# Patient Record
Sex: MALE | Race: WHITE
[De-identification: names, ages, dates, MRNs, and addresses within clinical notes are randomized per-mention and may not be internally consistent; named-entity substitution may affect disease eponyms.]

---

## 2017-07-21 ENCOUNTER — HOSPITAL ENCOUNTER (OUTPATIENT)
Dept: HOSPITAL 84 - D.CT | Age: 82
Discharge: HOME | End: 2017-07-21
Attending: ORTHOPAEDIC SURGERY
Payer: MEDICARE

## 2017-07-21 VITALS — BODY MASS INDEX: 29.2 KG/M2

## 2017-07-21 DIAGNOSIS — M54.16: Primary | ICD-10-CM

## 2017-09-21 ENCOUNTER — HOSPITAL ENCOUNTER (EMERGENCY)
Dept: HOSPITAL 84 - D.ER | Age: 82
Discharge: HOME | End: 2017-09-21
Payer: MEDICARE

## 2017-09-21 VITALS — BODY MASS INDEX: 29.2 KG/M2

## 2017-09-21 DIAGNOSIS — I48.91: ICD-10-CM

## 2017-09-21 DIAGNOSIS — Z95.0: ICD-10-CM

## 2017-09-21 DIAGNOSIS — A41.9: Primary | ICD-10-CM

## 2017-09-21 DIAGNOSIS — K92.2: ICD-10-CM

## 2017-09-21 DIAGNOSIS — I25.810: ICD-10-CM

## 2017-09-21 DIAGNOSIS — Z85.46: ICD-10-CM

## 2017-09-21 DIAGNOSIS — I10: ICD-10-CM

## 2017-09-21 LAB
ALBUMIN SERPL-MCNC: 3.5 G/DL (ref 3.4–5)
ALP SERPL-CCNC: 108 U/L (ref 46–116)
ALT SERPL-CCNC: 34 U/L (ref 10–68)
ANION GAP SERPL CALC-SCNC: 12.8 MMOL/L (ref 8–16)
APTT BLD: 37.9 SECONDS (ref 22.8–39.4)
BASOPHILS NFR BLD AUTO: 0.4 % (ref 0–2)
BILIRUB SERPL-MCNC: 0.28 MG/DL (ref 0.2–1.3)
BUN SERPL-MCNC: 25 MG/DL (ref 7–18)
CALCIUM SERPL-MCNC: 9.9 MG/DL (ref 8.5–10.1)
CHLORIDE SERPL-SCNC: 101 MMOL/L (ref 98–107)
CO2 SERPL-SCNC: 25.5 MMOL/L (ref 21–32)
CREAT SERPL-MCNC: 1.4 MG/DL (ref 0.6–1.3)
EOSINOPHIL NFR BLD: 6.2 % (ref 0–7)
ERYTHROCYTE [DISTWIDTH] IN BLOOD BY AUTOMATED COUNT: 13.7 % (ref 11.5–14.5)
GLOBULIN SER-MCNC: 3.8 G/L
GLUCOSE SERPL-MCNC: 162 MG/DL (ref 74–106)
HCT VFR BLD CALC: 32 % (ref 42–54)
HGB BLD-MCNC: 10.7 G/DL (ref 13.5–17.5)
IMM GRANULOCYTES NFR BLD: 1 % (ref 0–5)
INR PPP: 1.14 (ref 0.85–1.17)
LYMPHOCYTES NFR BLD AUTO: 23.8 % (ref 15–50)
MCH RBC QN AUTO: 32.2 PG (ref 26–34)
MCHC RBC AUTO-ENTMCNC: 33.4 G/DL (ref 31–37)
MCV RBC: 96.4 FL (ref 80–100)
MONOCYTES NFR BLD: 9.5 % (ref 2–11)
NEUTROPHILS NFR BLD AUTO: 59.1 % (ref 40–80)
OSMOLALITY SERPL CALC.SUM OF ELEC: 277 MOSM/KG (ref 275–300)
PLATELET # BLD: 240 10X3/UL (ref 130–400)
PMV BLD AUTO: 10.3 FL (ref 7.4–10.4)
POTASSIUM SERPL-SCNC: 4.3 MMOL/L (ref 3.5–5.1)
PROT SERPL-MCNC: 7.3 G/DL (ref 6.4–8.2)
PROTHROMBIN TIME: 14.5 SECONDS (ref 11.6–15)
RBC # BLD AUTO: 3.32 10X6/UL (ref 4.2–6.1)
SODIUM SERPL-SCNC: 135 MMOL/L (ref 136–145)
WBC # BLD AUTO: 8.9 10X3/UL (ref 4.8–10.8)

## 2017-09-27 ENCOUNTER — HOSPITAL ENCOUNTER (OUTPATIENT)
Dept: HOSPITAL 84 - D.OPS | Age: 82
Discharge: HOME | End: 2017-09-27
Attending: INTERNAL MEDICINE
Payer: MEDICARE

## 2017-09-27 VITALS — WEIGHT: 183.38 LBS | HEIGHT: 64 IN | BODY MASS INDEX: 31.31 KG/M2 | BODY MASS INDEX: 31.31 KG/M2

## 2017-09-27 VITALS — SYSTOLIC BLOOD PRESSURE: 150 MMHG | DIASTOLIC BLOOD PRESSURE: 68 MMHG

## 2017-09-27 DIAGNOSIS — K29.70: ICD-10-CM

## 2017-09-27 DIAGNOSIS — E03.9: ICD-10-CM

## 2017-09-27 DIAGNOSIS — Z01.812: ICD-10-CM

## 2017-09-27 DIAGNOSIS — D64.9: ICD-10-CM

## 2017-09-27 DIAGNOSIS — K92.1: Primary | ICD-10-CM

## 2017-09-27 DIAGNOSIS — K44.9: ICD-10-CM

## 2017-09-27 DIAGNOSIS — K20.9: ICD-10-CM

## 2017-09-27 DIAGNOSIS — J44.9: ICD-10-CM

## 2017-09-27 DIAGNOSIS — K22.2: ICD-10-CM

## 2017-09-27 DIAGNOSIS — K29.80: ICD-10-CM

## 2017-09-27 DIAGNOSIS — I10: ICD-10-CM

## 2017-09-27 LAB
ANION GAP SERPL CALC-SCNC: 14.7 MMOL/L (ref 8–16)
BASOPHILS NFR BLD AUTO: 0.4 % (ref 0–2)
BUN SERPL-MCNC: 23 MG/DL (ref 7–18)
CALCIUM SERPL-MCNC: 9.1 MG/DL (ref 8.5–10.1)
CHLORIDE SERPL-SCNC: 103 MMOL/L (ref 98–107)
CO2 SERPL-SCNC: 25.4 MMOL/L (ref 21–32)
CREAT SERPL-MCNC: 1.5 MG/DL (ref 0.6–1.3)
EOSINOPHIL NFR BLD: 5.4 % (ref 0–7)
ERYTHROCYTE [DISTWIDTH] IN BLOOD BY AUTOMATED COUNT: 13.9 % (ref 11.5–14.5)
GLUCOSE SERPL-MCNC: 99 MG/DL (ref 74–106)
H PYLORI IGG SER IA-ACNC: NEGATIVE
HCT VFR BLD CALC: 28.3 % (ref 42–54)
HGB BLD-MCNC: 9.4 G/DL (ref 13.5–17.5)
IMM GRANULOCYTES NFR BLD: 0.4 % (ref 0–5)
LYMPHOCYTES NFR BLD AUTO: 23.7 % (ref 15–50)
MCH RBC QN AUTO: 32.1 PG (ref 26–34)
MCHC RBC AUTO-ENTMCNC: 33.2 G/DL (ref 31–37)
MCV RBC: 96.6 FL (ref 80–100)
MONOCYTES NFR BLD: 11.7 % (ref 2–11)
NEUTROPHILS NFR BLD AUTO: 58.4 % (ref 40–80)
OSMOLALITY SERPL CALC.SUM OF ELEC: 281 MOSM/KG (ref 275–300)
PLATELET # BLD: 189 10X3/UL (ref 130–400)
PMV BLD AUTO: 10 FL (ref 7.4–10.4)
POTASSIUM SERPL-SCNC: 4.1 MMOL/L (ref 3.5–5.1)
RBC # BLD AUTO: 2.93 10X6/UL (ref 4.2–6.1)
SODIUM SERPL-SCNC: 139 MMOL/L (ref 136–145)
WBC # BLD AUTO: 7 10X3/UL (ref 4.8–10.8)

## 2017-09-27 NOTE — NUR
1150
DISCHARGE INSTRUCTIONS COMPLETE. PRESCIRPTIONS FOR FAMATODINE,
SUCRALFATE, AND AMOXICILLIN GIVEN. LABS ORDERED PER DR. RODRIGUEZ'S
ORDERS (ADDED ON TO PREVIOUSLY DRAWN BLOOD). ESCORTED OUT BY
VOLUNTEER.

## 2017-09-27 NOTE — OP
PATIENT NAME:  EDGARDO SARAVIA                            MEDICAL RECORD: [id number]
:34                                             LOCATION:D.OPS          
                                                         ADMISSION DATE:        
SURGEON:  MIGUEL RODRIGUEZ MD            
 
 
DATE OF OPERATION:  2017
 
PROCEDURE:  EGD with balloon dilatation.
 
MEDICATIONS PROVIDED:  Per TIVA anesthesia.  The patient received propofol 100
mg IV push and O2 which was variable.  We had to bag the patient 2 times during
the procedure due to a decreased saturation.  He was stable throughout the
procedure, but had a high risk.  He additionally received 1 gram of Ancef for
this procedure.
 
INDICATION FOR THE PROCEDURE:  Melenic stool and anemia.  This, according to the
patient, was present last week, but has resolved.  Of note, he is on Eliquis and
aspirin.  The patient has considerable comorbidities to include chronic
obstructive lung disease, myocardial infarction in , hypertension, cardiac
arrhythmias, prostate disease, and hypothyroidism.  He most recently was
hospitalized for Strep bovis infection and sepsis.  The patient has had
endoscopy with me in the past.  On 2016, he had a colonoscopy, which was
done for iron-deficiency anemia.  This was significant for a 0.5-cm polyp in the
cecum, which was seen to be a tubular adenoma.  He also had another 0.5-cm
adenoma in the ascending colon.  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
colon and he was provided a good bowel regimen after the procedure.  He also had
grade I internal hemorrhoids.  In 2016, this gentleman had an EGD
with me, which was significant for a distal esophageal ring, which he stated was
nonstenotic at that time.  He also was noted to have hiatal hernia, some
gastritis and hemorrhagic gastritis, and some benign narrowing at the entrance
to the second part of the duodenum.  The patient was provided Nexium one tablet
q.a.m. and we renewed his sucralfate 1 gram q.i.d.  He was asked to use caution
with anti-inflammatory drugs and follow reflux precautions stringently, both
dietary and positional.
 
Due to his multiple comorbidities and recent hospitalization for sepsis, we had
been hesitant to perform endoscopy; but as he has had some melenic stool, we
felt we would proceed with an EGD, feeling that the stomach was most likely the
source of bleeding.  The patient did receive 1 gram of Ancef as he has had heart
valve replacement.  Of note, the sepsis was due to Strep bovis, which can be
associated with endocarditis, which he did not have; and colorectal cancer.  The
patient had a CT of abdomen and pelvis in 2017 showing no significant
abnormality of the aorta.  He had postoperative changes with his median
sternotomy wires and permanent pacemaker noted.  He was seen on the CT to have a
large hiatal hernia, right renal cyst, previous prostatectomy, and multiple rib
fractures.
 
Given the melenic stool and the ongoing anemia, he will have an EGD on this
date.
 
FINDINGS:  Informed consent was given.  The patient was made comfortable with
the above medications.  After reaching an adequate level of sedation by slow IV
push, the patient was placed on his left side.  The endoscope was then advanced
under direct visualization through the posterior pharyngeal area and advanced to
the distal esophagus.  At the distal esophageal area, a stenotic Schatzki's ring
was noted and we gently placed a CRE Microvasive balloon in this area, held the
balloon in place for 60 seconds, and deflated the balloon.  There was a slight
 
 
 
OPERATIVE REPORT                               [id number]    EDGARDO SARAVIA          
 
 
amount of heme noted at the 7 o'clock position after removal of the balloon.  We
proceeded then through the hiatal hernia into the gastric proper.  We
retroflexed, exposing the hiatal hernia as well as the fundus and the stomach
body and no significant inflammation was appreciated in this area.  We then
advanced the scope to the antral area, where some bilious erosive gastritis was
appreciated but to a very minimal degree.  In passing through the antrum to the
duodenal bulb, it was noted that only minimal inflammation was noted.  We then
proceeded to the area of the proximal second portion of the duodenum, where a
stricture was again seen.  The tissue was somewhat inflamed in this area due to
the narrowing.  We were unable to pass the scope through this area, and as the
patient had his procedure disrupted twice during the procedure so that we might
bag him due to some respiratory depression, we did not feel we had additional
time to dilate this stricture.  The scope was then withdrawn.  No biopsies were
obtained again due to the lack of time as the patient was at risk for this
procedure with his respiratory depression and the compromise which we were
facing in attempting to give him adequate sedation.  The scope was then
withdrawn.
 
IMPRESSION:
1.  Minimal distal esophagitis.
2.  Stenotic Schatzki's ring, which was dilated to ____ with a good result.
3.  Large hiatal hernia.
4.  Very mild bilious and slightly erosive gastritis.
5.  Mild duodenitis in the duodenal bulb.
6.  Narrowing, which had been seen previously at the entrance to the second part
of the duodenum.  Unable to pass the scope through this area.
7.  Of note, we had to disrupt the procedure twice to bag the patient as he had
respiratory depression despite using a minimal amount of propofol.  His
respiratory reserve is compromised.  We were easily able to correct this
gentleman's oxygenation deficit with bagging and at no time was the procedure
severely compromised.
 
RECOMMENDATIONS:
1.  We will continue Carafate at a dose of 1 gram q.i.d.
2.  The patient will take famotidine 20 mg p.o. b.i.d.
3.  We will provide a dose of amoxicillin 1 gram to be taken 6 hours after this
procedure to protect his heart valves.
4.  The patient cannot have any additional endoscopies unless he is intubated,
which certainly increases the risk, and should not be done unless he is
experiencing significant hemorrhage.
5.  He is not a candidate for colonoscopy.  A barium enema will be ordered if he
wishes to proceed.
 
TRANSINT:[id number] Voice Confirmation ID: [id number] DOCUMENT ID: [id number]
                                           
                                           MIGUEL RODRIGUEZ MD            
 
CC: DIEGO GRIMES MD                                             [id number]
DICTATION DATE: 17 1051     :     17 1235      [facility] 
                                                                      17
[facility]                                          
[address] [place] [address]

## 2018-03-20 ENCOUNTER — HOSPITAL ENCOUNTER (OUTPATIENT)
Dept: HOSPITAL 84 - D.CATH | Age: 83
Discharge: HOME | End: 2018-03-20
Attending: INTERNAL MEDICINE
Payer: MEDICARE

## 2018-03-20 VITALS
WEIGHT: 180.38 LBS | WEIGHT: 180.38 LBS | HEIGHT: 64 IN | HEIGHT: 64 IN | BODY MASS INDEX: 30.8 KG/M2 | BODY MASS INDEX: 30.8 KG/M2

## 2018-03-20 VITALS — DIASTOLIC BLOOD PRESSURE: 81 MMHG | SYSTOLIC BLOOD PRESSURE: 163 MMHG

## 2018-03-20 DIAGNOSIS — I49.5: ICD-10-CM

## 2018-03-20 DIAGNOSIS — R94.30: ICD-10-CM

## 2018-03-20 DIAGNOSIS — R06.02: ICD-10-CM

## 2018-03-20 DIAGNOSIS — I25.119: Primary | ICD-10-CM

## 2018-03-20 DIAGNOSIS — I34.0: ICD-10-CM

## 2018-03-20 DIAGNOSIS — Z01.812: ICD-10-CM

## 2018-03-20 LAB
ANION GAP SERPL CALC-SCNC: 17.9 MMOL/L (ref 8–16)
BASOPHILS NFR BLD AUTO: 0 % (ref 0–2)
BUN SERPL-MCNC: 49 MG/DL (ref 7–18)
CALCIUM SERPL-MCNC: 9.8 MG/DL (ref 8.5–10.1)
CHLORIDE SERPL-SCNC: 103 MMOL/L (ref 98–107)
CO2 SERPL-SCNC: 21.8 MMOL/L (ref 21–32)
CREAT SERPL-MCNC: 1.5 MG/DL (ref 0.6–1.3)
EOSINOPHIL NFR BLD: 0.2 % (ref 0–7)
ERYTHROCYTE [DISTWIDTH] IN BLOOD BY AUTOMATED COUNT: 16.4 % (ref 11.5–14.5)
GLUCOSE SERPL-MCNC: 124 MG/DL (ref 74–106)
HCT VFR BLD CALC: 30.1 % (ref 42–54)
HGB BLD-MCNC: 9.6 G/DL (ref 13.5–17.5)
IMM GRANULOCYTES NFR BLD: 1.4 % (ref 0–5)
LYMPHOCYTES NFR BLD AUTO: 24.7 % (ref 15–50)
MCH RBC QN AUTO: 28.2 PG (ref 26–34)
MCHC RBC AUTO-ENTMCNC: 31.9 G/DL (ref 31–37)
MCV RBC: 88.3 FL (ref 80–100)
MONOCYTES NFR BLD: 12.4 % (ref 2–11)
NEUTROPHILS NFR BLD AUTO: 61.3 % (ref 40–80)
OSMOLALITY SERPL CALC.SUM OF ELEC: 289 MOSM/KG (ref 275–300)
PLATELET # BLD: 224 10X3/UL (ref 130–400)
PMV BLD AUTO: 10.4 FL (ref 7.4–10.4)
POTASSIUM SERPL-SCNC: 4.7 MMOL/L (ref 3.5–5.1)
RBC # BLD AUTO: 3.41 10X6/UL (ref 4.2–6.1)
SODIUM SERPL-SCNC: 138 MMOL/L (ref 136–145)
WBC # BLD AUTO: 8.9 10X3/UL (ref 4.8–10.8)

## 2018-03-20 PROCEDURE — 93459 L HRT ART/GRFT ANGIO: CPT

## 2019-01-15 NOTE — NUR
UNABLE TO DO MYELOGRAM. PATIENT IS ON PLAVIX. WILL CALL TO RESCHEDULE WHEN OFF
OF PLAVIX FOR 3 DAYS.

## 2019-01-28 ENCOUNTER — HOSPITAL ENCOUNTER (OUTPATIENT)
Dept: HOSPITAL 84 - D.RAD | Age: 84
Discharge: HOME | End: 2019-01-28
Attending: NURSE PRACTITIONER
Payer: MEDICARE

## 2019-01-28 VITALS — BODY MASS INDEX: 30.9 KG/M2

## 2019-01-28 DIAGNOSIS — M48.061: Primary | ICD-10-CM

## 2019-01-30 ENCOUNTER — HOSPITAL ENCOUNTER (INPATIENT)
Dept: HOSPITAL 84 - D.ER | Age: 84
LOS: 6 days | Discharge: NURSING, REHAB OR LONG-TERM CARE | DRG: 520 | End: 2019-02-05
Attending: INTERNAL MEDICINE | Admitting: INTERNAL MEDICINE
Payer: MEDICARE

## 2019-01-30 VITALS — DIASTOLIC BLOOD PRESSURE: 40 MMHG | SYSTOLIC BLOOD PRESSURE: 119 MMHG

## 2019-01-30 VITALS — HEIGHT: 64 IN | BODY MASS INDEX: 31.48 KG/M2 | WEIGHT: 184.39 LBS

## 2019-01-30 DIAGNOSIS — I25.10: ICD-10-CM

## 2019-01-30 DIAGNOSIS — I48.91: ICD-10-CM

## 2019-01-30 DIAGNOSIS — I10: ICD-10-CM

## 2019-01-30 DIAGNOSIS — G62.9: ICD-10-CM

## 2019-01-30 DIAGNOSIS — E11.9: ICD-10-CM

## 2019-01-30 DIAGNOSIS — M48.061: Primary | ICD-10-CM

## 2019-01-30 DIAGNOSIS — M48.062: ICD-10-CM

## 2019-01-30 DIAGNOSIS — M51.16: ICD-10-CM

## 2019-01-30 LAB
APPEARANCE UR: CLEAR
BILIRUB SERPL-MCNC: NEGATIVE MG/DL
COLOR UR: YELLOW
GLUCOSE SERPL-MCNC: NEGATIVE MG/DL
KETONES UR STRIP-MCNC: NEGATIVE MG/DL
NITRITE UR-MCNC: NEGATIVE MG/ML
PH UR STRIP: 5 [PH] (ref 5–6)
PROT UR-MCNC: NEGATIVE MG/DL
SP GR UR STRIP: 1.01 (ref 1–1.02)
UROBILINOGEN UR-MCNC: NORMAL MG/DL

## 2019-01-30 NOTE — NUR
ASSESSMENT AS PER FLOWSHEET. ALERT/ORIENTED X3 SON IN ROOM UP WITH HELP TO BR
VOIDED UA SPECIMEN OBTAINED AND SENT TO THE LAB. IV PATENT LEFT AC SALINE
LOCKED. TELM. SHOWS PACED AT 61. NO DISTRESS. SR UP X2 CALL LIGHT WITHIN
REACH.

## 2019-01-31 VITALS — DIASTOLIC BLOOD PRESSURE: 66 MMHG | SYSTOLIC BLOOD PRESSURE: 120 MMHG

## 2019-01-31 VITALS — DIASTOLIC BLOOD PRESSURE: 60 MMHG | SYSTOLIC BLOOD PRESSURE: 116 MMHG

## 2019-01-31 VITALS — SYSTOLIC BLOOD PRESSURE: 142 MMHG | DIASTOLIC BLOOD PRESSURE: 69 MMHG

## 2019-01-31 VITALS — SYSTOLIC BLOOD PRESSURE: 115 MMHG | DIASTOLIC BLOOD PRESSURE: 57 MMHG

## 2019-01-31 VITALS — DIASTOLIC BLOOD PRESSURE: 63 MMHG | SYSTOLIC BLOOD PRESSURE: 172 MMHG

## 2019-01-31 LAB
ANION GAP SERPL CALC-SCNC: 12.7 MMOL/L (ref 8–16)
BASOPHILS NFR BLD AUTO: 0.8 % (ref 0–2)
BUN SERPL-MCNC: 26 MG/DL (ref 7–18)
CALCIUM SERPL-MCNC: 8.6 MG/DL (ref 8.5–10.1)
CHLORIDE SERPL-SCNC: 105 MMOL/L (ref 98–107)
CO2 SERPL-SCNC: 24.6 MMOL/L (ref 21–32)
CREAT SERPL-MCNC: 1.5 MG/DL (ref 0.6–1.3)
EOSINOPHIL NFR BLD: 3.6 % (ref 0–7)
ERYTHROCYTE [DISTWIDTH] IN BLOOD BY AUTOMATED COUNT: 13.4 % (ref 11.5–14.5)
GLUCOSE SERPL-MCNC: 91 MG/DL (ref 74–106)
HCT VFR BLD CALC: 31.7 % (ref 42–54)
HGB BLD-MCNC: 10.4 G/DL (ref 13.5–17.5)
IMM GRANULOCYTES NFR BLD: 1 % (ref 0–5)
INR PPP: 1.04 (ref 0.85–1.17)
LYMPHOCYTES NFR BLD AUTO: 23.6 % (ref 15–50)
MCH RBC QN AUTO: 32.8 PG (ref 26–34)
MCHC RBC AUTO-ENTMCNC: 32.8 G/DL (ref 31–37)
MCV RBC: 100 FL (ref 80–100)
MONOCYTES NFR BLD: 14.8 % (ref 2–11)
NEUTROPHILS NFR BLD AUTO: 56.2 % (ref 40–80)
OSMOLALITY SERPL CALC.SUM OF ELEC: 280 MOSM/KG (ref 275–300)
PLATELET # BLD: 156 10X3/UL (ref 130–400)
PMV BLD AUTO: 10.8 FL (ref 7.4–10.4)
POTASSIUM SERPL-SCNC: 4.3 MMOL/L (ref 3.5–5.1)
PROTHROMBIN TIME: 13.1 SECONDS (ref 11.6–15)
RBC # BLD AUTO: 3.17 10X6/UL (ref 4.2–6.1)
SODIUM SERPL-SCNC: 138 MMOL/L (ref 136–145)
WBC # BLD AUTO: 6.1 10X3/UL (ref 4.8–10.8)

## 2019-01-31 NOTE — NUR
PT C/O BACK PAIN 8/10. GAVE NORCO-10 PO. ASSESSMENT COMPLETE PER FLOWSHEET.
DISCUSSED POSSIBLE SURGERY. PT AGREES TO BE NPO AFTER MIDNIGHT IN CASE CLEARED
FOR SURGERY.

## 2019-02-01 VITALS — DIASTOLIC BLOOD PRESSURE: 50 MMHG | SYSTOLIC BLOOD PRESSURE: 105 MMHG

## 2019-02-01 VITALS — DIASTOLIC BLOOD PRESSURE: 57 MMHG | SYSTOLIC BLOOD PRESSURE: 137 MMHG

## 2019-02-01 VITALS — SYSTOLIC BLOOD PRESSURE: 108 MMHG | DIASTOLIC BLOOD PRESSURE: 54 MMHG

## 2019-02-01 VITALS — DIASTOLIC BLOOD PRESSURE: 44 MMHG | SYSTOLIC BLOOD PRESSURE: 100 MMHG

## 2019-02-01 VITALS — DIASTOLIC BLOOD PRESSURE: 64 MMHG | SYSTOLIC BLOOD PRESSURE: 138 MMHG

## 2019-02-01 VITALS — SYSTOLIC BLOOD PRESSURE: 165 MMHG | DIASTOLIC BLOOD PRESSURE: 71 MMHG

## 2019-02-01 LAB
ANION GAP SERPL CALC-SCNC: 14.7 MMOL/L (ref 8–16)
BASOPHILS NFR BLD AUTO: 0.6 % (ref 0–2)
BUN SERPL-MCNC: 28 MG/DL (ref 7–18)
CALCIUM SERPL-MCNC: 8.5 MG/DL (ref 8.5–10.1)
CHLORIDE SERPL-SCNC: 103 MMOL/L (ref 98–107)
CO2 SERPL-SCNC: 23.7 MMOL/L (ref 21–32)
CREAT SERPL-MCNC: 1.6 MG/DL (ref 0.6–1.3)
EOSINOPHIL NFR BLD: 3.9 % (ref 0–7)
ERYTHROCYTE [DISTWIDTH] IN BLOOD BY AUTOMATED COUNT: 13.4 % (ref 11.5–14.5)
FERRITIN SERPL-MCNC: 270 NG/ML (ref 3–244)
GLUCOSE SERPL-MCNC: 113 MG/DL (ref 74–106)
HCT VFR BLD CALC: 31.5 % (ref 42–54)
HGB BLD-MCNC: 10.4 G/DL (ref 13.5–17.5)
IMM GRANULOCYTES NFR BLD: 1.5 % (ref 0–5)
IRON SERPL-MCNC: 74 UG/DL (ref 35–150)
LYMPHOCYTES NFR BLD AUTO: 30.2 % (ref 15–50)
MCH RBC QN AUTO: 32.8 PG (ref 26–34)
MCHC RBC AUTO-ENTMCNC: 33 G/DL (ref 31–37)
MCV RBC: 99.4 FL (ref 80–100)
MONOCYTES NFR BLD: 17.6 % (ref 2–11)
NEUTROPHILS NFR BLD AUTO: 46.2 % (ref 40–80)
OSMOLALITY SERPL CALC.SUM OF ELEC: 280 MOSM/KG (ref 275–300)
PLATELET # BLD: 149 10X3/UL (ref 130–400)
PMV BLD AUTO: 10.7 FL (ref 7.4–10.4)
POTASSIUM SERPL-SCNC: 4.4 MMOL/L (ref 3.5–5.1)
RBC # BLD AUTO: 3.17 10X6/UL (ref 4.2–6.1)
SAO2 % BLD FROM PO2: 36 % (ref 15–55)
SODIUM SERPL-SCNC: 137 MMOL/L (ref 136–145)
TIBC SERPL-MCNC: 201 UG/DL (ref 260–445)
UIBC SERPL-MCNC: 127 UG/DL (ref 150–375)
WBC # BLD AUTO: 6.5 10X3/UL (ref 4.8–10.8)

## 2019-02-01 NOTE — NUR
PATIENT RESTING IN BED WITH INTERMITENT PAIN REPORTED TO LOWER BACK THAT
RADIATES DOWN BOTH HIPS AND BACK SIDE OF KNEES. PATIENT NPO FOR POSSIBLE
PROCEDURE TODAY IF CARDIAC CLEARANCE FROM DR SAGASTUME.

## 2019-02-02 VITALS — SYSTOLIC BLOOD PRESSURE: 106 MMHG | DIASTOLIC BLOOD PRESSURE: 44 MMHG

## 2019-02-02 VITALS — DIASTOLIC BLOOD PRESSURE: 46 MMHG | SYSTOLIC BLOOD PRESSURE: 104 MMHG

## 2019-02-02 VITALS — DIASTOLIC BLOOD PRESSURE: 49 MMHG | SYSTOLIC BLOOD PRESSURE: 108 MMHG

## 2019-02-02 VITALS — DIASTOLIC BLOOD PRESSURE: 43 MMHG | SYSTOLIC BLOOD PRESSURE: 106 MMHG

## 2019-02-02 VITALS — DIASTOLIC BLOOD PRESSURE: 60 MMHG | SYSTOLIC BLOOD PRESSURE: 113 MMHG

## 2019-02-02 VITALS — DIASTOLIC BLOOD PRESSURE: 48 MMHG | SYSTOLIC BLOOD PRESSURE: 113 MMHG

## 2019-02-02 VITALS — SYSTOLIC BLOOD PRESSURE: 108 MMHG | DIASTOLIC BLOOD PRESSURE: 49 MMHG

## 2019-02-02 VITALS — DIASTOLIC BLOOD PRESSURE: 50 MMHG | SYSTOLIC BLOOD PRESSURE: 109 MMHG

## 2019-02-02 VITALS — SYSTOLIC BLOOD PRESSURE: 115 MMHG | DIASTOLIC BLOOD PRESSURE: 53 MMHG

## 2019-02-02 VITALS — DIASTOLIC BLOOD PRESSURE: 51 MMHG | SYSTOLIC BLOOD PRESSURE: 127 MMHG

## 2019-02-02 VITALS — DIASTOLIC BLOOD PRESSURE: 68 MMHG | SYSTOLIC BLOOD PRESSURE: 161 MMHG

## 2019-02-02 VITALS — DIASTOLIC BLOOD PRESSURE: 67 MMHG | SYSTOLIC BLOOD PRESSURE: 157 MMHG

## 2019-02-02 VITALS — SYSTOLIC BLOOD PRESSURE: 148 MMHG | DIASTOLIC BLOOD PRESSURE: 67 MMHG

## 2019-02-02 VITALS — SYSTOLIC BLOOD PRESSURE: 121 MMHG | DIASTOLIC BLOOD PRESSURE: 47 MMHG

## 2019-02-02 VITALS — DIASTOLIC BLOOD PRESSURE: 50 MMHG | SYSTOLIC BLOOD PRESSURE: 116 MMHG

## 2019-02-02 VITALS — SYSTOLIC BLOOD PRESSURE: 106 MMHG | DIASTOLIC BLOOD PRESSURE: 58 MMHG

## 2019-02-02 LAB
ANION GAP SERPL CALC-SCNC: 13.5 MMOL/L (ref 8–16)
BASOPHILS NFR BLD AUTO: 0.6 % (ref 0–2)
BUN SERPL-MCNC: 26 MG/DL (ref 7–18)
CALCIUM SERPL-MCNC: 8.5 MG/DL (ref 8.5–10.1)
CHLORIDE SERPL-SCNC: 105 MMOL/L (ref 98–107)
CO2 SERPL-SCNC: 25.1 MMOL/L (ref 21–32)
CREAT SERPL-MCNC: 1.5 MG/DL (ref 0.6–1.3)
EOSINOPHIL NFR BLD: 4.8 % (ref 0–7)
ERYTHROCYTE [DISTWIDTH] IN BLOOD BY AUTOMATED COUNT: 13.5 % (ref 11.5–14.5)
FOLATE SERPL-MCNC: 11.5 NG/ML (ref 3–?)
GLUCOSE SERPL-MCNC: 99 MG/DL (ref 74–106)
HCT VFR BLD CALC: 31.6 % (ref 42–54)
HGB BLD-MCNC: 10.5 G/DL (ref 13.5–17.5)
IMM GRANULOCYTES NFR BLD: 1.5 % (ref 0–5)
LYMPHOCYTES NFR BLD AUTO: 28.6 % (ref 15–50)
MCH RBC QN AUTO: 33.1 PG (ref 26–34)
MCHC RBC AUTO-ENTMCNC: 33.2 G/DL (ref 31–37)
MCV RBC: 99.7 FL (ref 80–100)
MONOCYTES NFR BLD: 17 % (ref 2–11)
NEUTROPHILS NFR BLD AUTO: 47.5 % (ref 40–80)
OSMOLALITY SERPL CALC.SUM OF ELEC: 282 MOSM/KG (ref 275–300)
PLATELET # BLD: 144 10X3/UL (ref 130–400)
PMV BLD AUTO: 10.7 FL (ref 7.4–10.4)
POTASSIUM SERPL-SCNC: 4.6 MMOL/L (ref 3.5–5.1)
RBC # BLD AUTO: 3.17 10X6/UL (ref 4.2–6.1)
SODIUM SERPL-SCNC: 139 MMOL/L (ref 136–145)
VIT B12 SERPL-MCNC: 1010 PG/ML (ref 232–1245)
WBC # BLD AUTO: 6.2 10X3/UL (ref 4.8–10.8)

## 2019-02-02 PROCEDURE — 00NY0ZZ RELEASE LUMBAR SPINAL CORD, OPEN APPROACH: ICD-10-PCS | Performed by: NEUROLOGICAL SURGERY

## 2019-02-02 NOTE — NUR
I&O COMPLETE - PT VOICED NO CONCERNS - DENIED PAIN - GRANDDAUGHTER AT BEDSIDE
- ASSISTED PATIENT WITH EATING DINNER TRAY - CPOC

## 2019-02-02 NOTE — NUR
FAMILY CALLED TO GIVE HOME PHONE NUMBER FOR F/U IF NEEDED [phone] -
ANSWERED ALL QUESTIONS TO FAMILY'S SATISFACTION

## 2019-02-02 NOTE — NUR
Received report. Pt currently in bed watching television. Provided urinal per
request. Pt denies further needs at this time.

## 2019-02-02 NOTE — NUR
FAMILY AT BEDSIDE FOR VISITATION - ANSWERED ALL QUETIONS TO FAMILY'S
SATISFACTION - DR. WILLIAM ON UNIT - INFORMED OF PT'S TRANSFER - AWAITING
ORDERS -

## 2019-02-02 NOTE — NUR
PT TRANSFERRED VIA BED FROM OPERATING ROOM WITH RECOVERY STAFF - PATIENT AA&O
X 4 - PAIN 2 -3 OF 10 SCALE - ASSESSMENT COMPLETE CPOC

## 2019-02-03 VITALS — SYSTOLIC BLOOD PRESSURE: 145 MMHG | DIASTOLIC BLOOD PRESSURE: 65 MMHG

## 2019-02-03 VITALS — SYSTOLIC BLOOD PRESSURE: 108 MMHG | DIASTOLIC BLOOD PRESSURE: 49 MMHG

## 2019-02-03 VITALS — DIASTOLIC BLOOD PRESSURE: 52 MMHG | SYSTOLIC BLOOD PRESSURE: 117 MMHG

## 2019-02-03 VITALS — SYSTOLIC BLOOD PRESSURE: 143 MMHG | DIASTOLIC BLOOD PRESSURE: 64 MMHG

## 2019-02-03 VITALS — DIASTOLIC BLOOD PRESSURE: 61 MMHG | SYSTOLIC BLOOD PRESSURE: 126 MMHG

## 2019-02-03 VITALS — SYSTOLIC BLOOD PRESSURE: 119 MMHG | DIASTOLIC BLOOD PRESSURE: 69 MMHG

## 2019-02-03 VITALS — SYSTOLIC BLOOD PRESSURE: 136 MMHG | DIASTOLIC BLOOD PRESSURE: 65 MMHG

## 2019-02-03 VITALS — DIASTOLIC BLOOD PRESSURE: 55 MMHG | SYSTOLIC BLOOD PRESSURE: 126 MMHG

## 2019-02-03 VITALS — SYSTOLIC BLOOD PRESSURE: 122 MMHG | DIASTOLIC BLOOD PRESSURE: 55 MMHG

## 2019-02-03 VITALS — SYSTOLIC BLOOD PRESSURE: 112 MMHG | DIASTOLIC BLOOD PRESSURE: 41 MMHG

## 2019-02-03 VITALS — DIASTOLIC BLOOD PRESSURE: 60 MMHG | SYSTOLIC BLOOD PRESSURE: 135 MMHG

## 2019-02-03 LAB
ANION GAP SERPL CALC-SCNC: 17 MMOL/L (ref 8–16)
BASOPHILS NFR BLD AUTO: 0.1 % (ref 0–2)
BUN SERPL-MCNC: 26 MG/DL (ref 7–18)
CALCIUM SERPL-MCNC: 8.5 MG/DL (ref 8.5–10.1)
CHLORIDE SERPL-SCNC: 105 MMOL/L (ref 98–107)
CO2 SERPL-SCNC: 21.9 MMOL/L (ref 21–32)
CREAT SERPL-MCNC: 1.3 MG/DL (ref 0.6–1.3)
EOSINOPHIL NFR BLD: 0 % (ref 0–7)
ERYTHROCYTE [DISTWIDTH] IN BLOOD BY AUTOMATED COUNT: 13.4 % (ref 11.5–14.5)
GLUCOSE SERPL-MCNC: 108 MG/DL (ref 74–106)
HCT VFR BLD CALC: 32.1 % (ref 42–54)
HGB BLD-MCNC: 10.7 G/DL (ref 13.5–17.5)
IMM GRANULOCYTES NFR BLD: 0.6 % (ref 0–5)
LYMPHOCYTES NFR BLD AUTO: 8.6 % (ref 15–50)
MCH RBC QN AUTO: 33.3 PG (ref 26–34)
MCHC RBC AUTO-ENTMCNC: 33.3 G/DL (ref 31–37)
MCV RBC: 100 FL (ref 80–100)
MONOCYTES NFR BLD: 11.7 % (ref 2–11)
NEUTROPHILS NFR BLD AUTO: 79 % (ref 40–80)
OSMOLALITY SERPL CALC.SUM OF ELEC: 283 MOSM/KG (ref 275–300)
PLATELET # BLD: 152 10X3/UL (ref 130–400)
PMV BLD AUTO: 10.8 FL (ref 7.4–10.4)
POTASSIUM SERPL-SCNC: 4.9 MMOL/L (ref 3.5–5.1)
RBC # BLD AUTO: 3.21 10X6/UL (ref 4.2–6.1)
SODIUM SERPL-SCNC: 139 MMOL/L (ref 136–145)
WBC # BLD AUTO: 12.2 10X3/UL (ref 4.8–10.8)

## 2019-02-03 NOTE — NUR
PT LYING IN BED, FAMILY AT BEDSIDE. NO SIGNS OF DISTRESS. ALERT AND ORIENTED.
STATES PAIN IN BACK 9/10. INFORMED PT IT WAS NOT YET TIME FOR HIS NORCO BUT I
COULD GIVE HIM TYLENOL. PT AGREED, TYLENOL GIVEN. IV RIGHT HAND INFUSING NS @
50. NO OTHER NEEDS OR COMPLAINTS AT THIS TIME. CL IN REACH, SRX2, BED LOWEST
POSITION. WILL CONTINUE TO MONITOR

## 2019-02-03 NOTE — NUR
PT TRANSPORTED TO ROOM 2239 VIA WHEELCHAIR. PT IS ABLE TO AMBULATE WITH ASSIST
FROM WHEELCHAIR TO BED. DRESSING TO LOWER BACK IS C/D/I. PT DENIES PRESENCE OF
PAIN AND N/V. PT DENIES DIZZINESS AND LIGHTHEADEDNESS. PIV NOTED TO RIGHT HAND
WITH NS @ 50. PIV TO LEFT AC THAT IS SALINE LOCKED. BED IS IN THE LOWEST
POSITION. CALL LIGHT AND BEDSIDE TABLE ARE WITHIN REACH. WILL CONT TO MONITOR.

## 2019-02-03 NOTE — NUR
REPORT RECIEVED, SHIFT ASSESSMENT COMPLETE, PT C/O OF HIP PAIN, ORDERED PAIN
MED GIVEN, VSS, CALL LIGHT IN REACH

## 2019-02-04 VITALS — SYSTOLIC BLOOD PRESSURE: 137 MMHG | DIASTOLIC BLOOD PRESSURE: 55 MMHG

## 2019-02-04 VITALS — DIASTOLIC BLOOD PRESSURE: 66 MMHG | SYSTOLIC BLOOD PRESSURE: 138 MMHG

## 2019-02-04 VITALS — DIASTOLIC BLOOD PRESSURE: 55 MMHG | SYSTOLIC BLOOD PRESSURE: 125 MMHG

## 2019-02-04 VITALS — DIASTOLIC BLOOD PRESSURE: 53 MMHG | SYSTOLIC BLOOD PRESSURE: 136 MMHG

## 2019-02-04 VITALS — SYSTOLIC BLOOD PRESSURE: 141 MMHG | DIASTOLIC BLOOD PRESSURE: 60 MMHG

## 2019-02-04 LAB
ANION GAP SERPL CALC-SCNC: 14.4 MMOL/L (ref 8–16)
BASOPHILS NFR BLD AUTO: 0.2 % (ref 0–2)
BUN SERPL-MCNC: 26 MG/DL (ref 7–18)
CALCIUM SERPL-MCNC: 8.2 MG/DL (ref 8.5–10.1)
CHLORIDE SERPL-SCNC: 105 MMOL/L (ref 98–107)
CO2 SERPL-SCNC: 24.1 MMOL/L (ref 21–32)
CREAT SERPL-MCNC: 1.3 MG/DL (ref 0.6–1.3)
EOSINOPHIL NFR BLD: 2.8 % (ref 0–7)
ERYTHROCYTE [DISTWIDTH] IN BLOOD BY AUTOMATED COUNT: 14.1 % (ref 11.5–14.5)
GLUCOSE SERPL-MCNC: 118 MG/DL (ref 74–106)
HCT VFR BLD CALC: 29.4 % (ref 42–54)
HGB BLD-MCNC: 9.6 G/DL (ref 13.5–17.5)
IMM GRANULOCYTES NFR BLD: 1.1 % (ref 0–5)
LYMPHOCYTES NFR BLD AUTO: 20.5 % (ref 15–50)
MCH RBC QN AUTO: 32.9 PG (ref 26–34)
MCHC RBC AUTO-ENTMCNC: 32.7 G/DL (ref 31–37)
MCV RBC: 100.7 FL (ref 80–100)
MONOCYTES NFR BLD: 17.4 % (ref 2–11)
NEUTROPHILS NFR BLD AUTO: 58 % (ref 40–80)
OSMOLALITY SERPL CALC.SUM OF ELEC: 283 MOSM/KG (ref 275–300)
PLATELET # BLD: 157 10X3/UL (ref 130–400)
PMV BLD AUTO: 10.8 FL (ref 7.4–10.4)
POTASSIUM SERPL-SCNC: 4.5 MMOL/L (ref 3.5–5.1)
RBC # BLD AUTO: 2.92 10X6/UL (ref 4.2–6.1)
SODIUM SERPL-SCNC: 139 MMOL/L (ref 136–145)
WBC # BLD AUTO: 8.2 10X3/UL (ref 4.8–10.8)

## 2019-02-04 NOTE — MORECARE
CASE MANAGEMENT DISCHARGE SUMMARY
 
 
PATIENT: EDGARDO MENDOZA                      UNIT: [id number]
ACCOUNT#: [id number]                       ADM DATE: 19
AGE: 84     : 34  SEX: M            ROOM/BED: D.2239    
AUTHOR: NORMA,DOC                             PHYSICIAN:                               
 
REFERRING PHYSICIAN: MAURICIO WILLIAM MD               
DATE OF SERVICE: 19
Discharge Plan
 
 
Patient Name: EDGARDO MENDOZA
Facility: [facility]:[place]
Encounter #: [id number]
Medical Record #: [id number]
: 1934
Planned Disposition: Inpatient Rehab
Anticipated Discharge Date: 
 
Discharge Date: 
Expected LOS: 
Initial Reviewer: [id number]
Initial Review Date: 2019
Generated: 19  11:52 am 
Comments
 
DCP- Discharge Planning
 
Updated by [id number]: Luciateto Gregorio on 19   9:47 am CT
CM spoke with pt and pt's sons, with his permission. Sons Niles Mendoza and 
Efrem Mendoza present for interview. They state pt has been living with Niles 
and his wife. He has been independent with ADL's, just uses a walker, no 
other equipment. States they have used [facility] in past. They 
state they plan for pt to go to rehab for therapy and their plan is to take 
back home following discharge and care for him at home. Case management with 
follow and assist with discharge planning as needed.
 DCPIA - Discharge Planning Initial Assessment
 
Updated by [id number]: Luciateto Gregorio on 19  10:41 am
*  Is the patient Alert and Oriented?
Yes
*  How many steps to enter\exit or inside your home? 0/0 *  PCP Yolande Portillo, LENA * 
Pharmacy
[facility]
*  Preadmission Environment
Home with Family
*  ADLs
Independent
 
*  Equipment
Rolling Walker
*  Other Equipment
none
*  List name and contact numbers for known caregivers / representatives who 
currently or will assist patient after discharge:
Niles Mendoza, son [phone]  Efrem Mendoza, son [phone]
 
*  Verbal permission to speak to the caregivers and representatives has been 
obtained from the patient.
Yes
*  Community resources currently utilized
None
*  Additional services required to return to the preadmission environment?
Yes
*  Can the patient safely return to the preadmission environment?
Yes
*  Has this patient been hospitalized within the prior 30 days at any 
hospital?
No
 
 
 
 
 
 
 
Last DP export: 19   9:44 am
Patient Name: EDGARDO MENDOZA
Encounter #: [id number]
Page [phone]
 
 
 
 
 
Electronically Signed by NASH GREEN on 19 at 1052
 
 
 
 
 
 
**All edits/amendments must be made on the electronic document**
 
DICTATION DATE: 19 105     : FLORA  19 105     
RPT#: [id number]                                DC DATE:        
                                               STATUS: ADM IN  
[facility]
 [place] [address]
***END OF REPORT***

## 2019-02-04 NOTE — NUR
PT PAIN 7/10. GAVE NORCO AS ORDERED. , NO COVERAGE PER SS. PT DENIES
NEEDS AT THIS TIME. CL IN REACH, WILL CONTINUE TO MONITOR

## 2019-02-04 NOTE — NUR
PT RESTING IN BED, EYES CLOSED. NO C/O PAIN. NO S/S OF ACUTE DISTRESS NOTED.
PT DENIES ANYTHING FURTHER AT THIS TIME. SON AT BEDSIDE. CALL LIGHT IN REACH.
WILL CONTINUE TO MONITOR.

## 2019-02-04 NOTE — NUR
NUTRITION F/U
PT TOLERATING DIABETIC DIET WITH 50% INTAKE MEALS TODAY. WILL CONTINUE TO
PROVIDE DIET, MONITOR PO INTAKE.
RD FOLLOWING

## 2019-02-04 NOTE — MORECARE
CASE MANAGEMENT DISCHARGE SUMMARY
 
 
PATIENT: EDGARDO MENDOZA                      UNIT: [id number]
ACCOUNT#: [id number]                       ADM DATE: 19
AGE: 84     : 34  SEX: M            ROOM/BED: D.2239    
AUTHOR: NASH GREEN                             PHYSICIAN:                               
 
REFERRING PHYSICIAN: MAURICIO WILLIAM MD               
DATE OF SERVICE: 19
Discharge Plan
 
 
Patient Name: EDGARDO MENDOZA
[facility]
Encounter #: [id number]
Medical Record #: [id number]
: 1934
Planned Disposition: Inpatient Rehab
Anticipated Discharge Date: 
 
Discharge Date: 
Expected LOS: 
Initial Reviewer: [id number]
Initial Review Date: 2019
Generated: 19  11:44 am 
 DCPIA - Discharge Planning Initial Assessment
 
Updated by [id number]: Lucia Gregorio on 19  10:41 am
*  Is the patient Alert and Oriented?
Yes
*  How many steps to enter\exit or inside your home? 0/0 *  PCP Yolande Portillo, ANP * 
Pharmacy
[facility]
*  Preadmission Environment
Home with Family
*  ADLs
Independent
*  Equipment
Rolling Walker
*  Other Equipment
none
*  List name and contact numbers for known caregivers / representatives who 
currently or will assist patient after discharge:
Niles Mendoza, son [phone]  Efrem Mendoza son [phone]
 
*  Verbal permission to speak to the caregivers and representatives has been 
obtained from the patient.
Yes
*  Community resources currently utilized
 
None
*  Additional services required to return to the preadmission environment?
Yes
*  Can the patient safely return to the preadmission environment?
Yes
*  Has this patient been hospitalized within the prior 30 days at any 
hospital?
No
 
 
 
 
 
 
Patient Name: EDGARDO MENDOZA
Encounter #: [id number]
Page [phone]
 
 
 
 
 
Electronically Signed by NASH GREEN on 19 at 1044
 
 
 
 
 
 
**All edits/amendments must be made on the electronic document**
 
DICTATION DATE: 19 1043     : FLORA  19 1043     
RPT#: [id number]                                DC DATE:        
                                               STATUS: ADM IN  
[facility]
[address] [place] [address]
***END OF REPORT***

## 2019-02-04 NOTE — NUR
PT RESTING IN BED, EYES CLOSED. RESPIRATIONS EVEN AND UNLABORED. AROUSES TO
VOICE. SON AT BED SIDE. PT HAD LOWER SPINE LAMINECTOMY POD2. SMALL INCISION TO
LOWER BACK, DRESSING CDI. PT ACHS. SCDS PRESENT. PT UP WITH PHYSICAL THERAPY.
PT Jena. PT C/O PAIN, 6/10. NORCO GIVEN AT 0620 THIS AM FOR PAIN. NO S/S OF
ACUTE DISTRESS NOTED. CALL LIGHT IN REACH. WILL CONTINUE TO MONITOR.

## 2019-02-05 ENCOUNTER — HOSPITAL ENCOUNTER (INPATIENT)
Dept: HOSPITAL 84 - D.REHAB | Age: 84
LOS: 15 days | Discharge: HOME HEALTH | DRG: 552 | End: 2019-02-20
Attending: FAMILY MEDICINE | Admitting: FAMILY MEDICINE
Payer: MEDICARE

## 2019-02-05 VITALS — DIASTOLIC BLOOD PRESSURE: 57 MMHG | SYSTOLIC BLOOD PRESSURE: 149 MMHG

## 2019-02-05 VITALS
HEIGHT: 63 IN | BODY MASS INDEX: 31.89 KG/M2 | WEIGHT: 180 LBS | BODY MASS INDEX: 31.89 KG/M2 | BODY MASS INDEX: 31.89 KG/M2 | HEIGHT: 63 IN | WEIGHT: 180 LBS

## 2019-02-05 VITALS — SYSTOLIC BLOOD PRESSURE: 111 MMHG | DIASTOLIC BLOOD PRESSURE: 60 MMHG

## 2019-02-05 VITALS — DIASTOLIC BLOOD PRESSURE: 55 MMHG | SYSTOLIC BLOOD PRESSURE: 135 MMHG

## 2019-02-05 DIAGNOSIS — M19.90: ICD-10-CM

## 2019-02-05 DIAGNOSIS — M48.061: Primary | ICD-10-CM

## 2019-02-05 DIAGNOSIS — G62.9: ICD-10-CM

## 2019-02-05 DIAGNOSIS — D53.9: ICD-10-CM

## 2019-02-05 DIAGNOSIS — N40.0: ICD-10-CM

## 2019-02-05 DIAGNOSIS — G95.9: ICD-10-CM

## 2019-02-05 DIAGNOSIS — M54.16: ICD-10-CM

## 2019-02-05 DIAGNOSIS — Z95.0: ICD-10-CM

## 2019-02-05 DIAGNOSIS — E11.9: ICD-10-CM

## 2019-02-05 DIAGNOSIS — I25.10: ICD-10-CM

## 2019-02-05 LAB
ANION GAP SERPL CALC-SCNC: 15.5 MMOL/L (ref 8–16)
BASOPHILS NFR BLD AUTO: 0.3 % (ref 0–2)
BUN SERPL-MCNC: 24 MG/DL (ref 7–18)
CALCIUM SERPL-MCNC: 8.3 MG/DL (ref 8.5–10.1)
CHLORIDE SERPL-SCNC: 103 MMOL/L (ref 98–107)
CO2 SERPL-SCNC: 22.2 MMOL/L (ref 21–32)
CREAT SERPL-MCNC: 1.1 MG/DL (ref 0.6–1.3)
EOSINOPHIL NFR BLD: 2.4 % (ref 0–7)
ERYTHROCYTE [DISTWIDTH] IN BLOOD BY AUTOMATED COUNT: 14 % (ref 11.5–14.5)
GLUCOSE SERPL-MCNC: 134 MG/DL (ref 74–106)
HCT VFR BLD CALC: 30.4 % (ref 42–54)
HGB BLD-MCNC: 9.9 G/DL (ref 13.5–17.5)
IMM GRANULOCYTES NFR BLD: 1.3 % (ref 0–5)
LYMPHOCYTES NFR BLD AUTO: 13 % (ref 15–50)
MCH RBC QN AUTO: 32.7 PG (ref 26–34)
MCHC RBC AUTO-ENTMCNC: 32.6 G/DL (ref 31–37)
MCV RBC: 100.3 FL (ref 80–100)
MONOCYTES NFR BLD: 13.5 % (ref 2–11)
NEUTROPHILS NFR BLD AUTO: 69.5 % (ref 40–80)
OSMOLALITY SERPL CALC.SUM OF ELEC: 277 MOSM/KG (ref 275–300)
PLATELET # BLD: 173 10X3/UL (ref 130–400)
PMV BLD AUTO: 10.8 FL (ref 7.4–10.4)
POTASSIUM SERPL-SCNC: 4.7 MMOL/L (ref 3.5–5.1)
RBC # BLD AUTO: 3.03 10X6/UL (ref 4.2–6.1)
SODIUM SERPL-SCNC: 136 MMOL/L (ref 136–145)
WBC # BLD AUTO: 9.3 10X3/UL (ref 4.8–10.8)

## 2019-02-05 NOTE — NUR
SITTING UP IN BED FOR LUNCH. TRAY FIXED PER STAFF. DENIES NEEDS. REPORTS WAS
UP IN CHAIR PER PT FOR ABOUT 30 MIN.

## 2019-02-05 NOTE — NUR
PT RESTING IN BED, EYES OPEN. SON AT BEDSIDE. NO C/O PAIN. NO S/S OF ACUTE
DISTRESS NOTED. PT POD 3 LAMINECTOMY, DRESSING CDI. PT [place]. PT UP WITH
PHYSICAL THERAPY. IV TO RIGHT HAND, SITE PATENT WITHOUT REDNESS OR SWELLING.
1/2 NS INFUSING @ 50ML/HR. PCA MORPHINE 1-10-10. PT DENIES ANYTHING FURTHER AT
THIS TIME. CALL LIGHT IN REACH. FALL PRECAUTIONS IN PLACE. WILL CONTINUE TO
MONITOR.

## 2019-02-05 NOTE — MORECARE
CASE MANAGEMENT DISCHARGE SUMMARY
 
 
PATIENT: EDGARDO MENDOZA                      UNIT: [id number]
ACCOUNT#: [id number]                       ADM DATE: 19
AGE: 84     : 34  SEX: M            ROOM/BED: D.2239    
AUTHOR: NORMA,DOC                             PHYSICIAN:                               
 
REFERRING PHYSICIAN: MAURICIO WILLIAM MD               
DATE OF SERVICE: 19
Discharge Plan
 
 
Patient Name: EDGARDO MENDOZA
Facility: [facility]:[place]
Encounter #: [id number]
Medical Record #: [id number]
: 1934
Planned Disposition: Inpatient Rehab
Anticipated Discharge Date: 
 
Discharge Date: 
Expected LOS: 
Initial Reviewer: [id number]
Initial Review Date: 2019
Generated: 19   4:21 pm 
Comments
 
DCP- Discharge Planning
 
Updated by [id number]: Lena Stella on 19   2:14 pm CT
Received discharge order, patient and family agree to plan. Ginger in 
inpatient rehab informed of discharge order. CM will continue to follow and 
assist with discharge planning/needs.
DCP- Discharge Planning
 
Updated by [id number]: Lena Fostercristiano on 19   8:49 am CT
Met with patient, he is sitting up in the chair, to discuss discharge 
planning. He would like to go to inpatient rehab at [facility] when discharged from 
acute care. Rehab screen ordered. CM will continue to follow and assist with 
discharge planning/needs.
DCP- Discharge Planning
 
Updated by [id number]: Lucia Gregorio on 19   9:47 am CT
CM spoke with pt and pt's sons, with his permission. Sons Niles Mendoza and 
Efrem Mendoza present for interview. They state pt has been living with Niles 
and his wife. He has been independent with ADL's, just uses a walker, no 
other equipment. States they have used [facility] in past. They 
state they plan for pt to go to rehab for therapy and their plan is to take 
back home following discharge and care for him at home. Case management with 
 
follow and assist with discharge planning as needed.
 DCPIA - Discharge Planning Initial Assessment
 
Updated by [id number]: Lucia Gregorio on 19  10:41 am
*  Is the patient Alert and Oriented?
Yes
*  How many steps to enter\exit or inside your home? 0/0 *  PCP Yolande Portillo, ANP * 
Pharmacy
[facility]
*  Preadmission Environment
Home with Family
*  ADLs
Independent
*  Equipment
Rolling Walker
*  Other Equipment
none
*  List name and contact numbers for known caregivers / representatives who 
currently or will assist patient after discharge:
Niles Mendoza, son [phone]  binta Eric [phone]
 
*  Verbal permission to speak to the caregivers and representatives has been 
obtained from the patient.
Yes
*  Community resources currently utilized
None
*  Additional services required to return to the preadmission environment?
Yes
*  Can the patient safely return to the preadmission environment?
Yes
*  Has this patient been hospitalized within the prior 30 days at any 
hospital?
No
 
 
 
 
 
Coverage Notice
 
Reviewer: [email or web address] Ashtyn Douglas
 
Notice Issued Date-Time: 2019  15:11
Notice Type: IM Discharge Notice
 
Notice Delivered To: Patient
Relationship to Patient: Self
Representative Name: 
 
Delivery Method: HAND - Hand Delivered
Mariluz Days:
Prior Verbal Notification: 
 
 
Recipient Understood Notice: Yes
Recipient Signature: Yes
Med Rec Note Co-signed by Attending:
 
Coverage Notice Comment:  IMM explained, signed, given, copy on chart.
 
Last DP export: 19   8:55 am
Patient Name: EDGARDO MENDOZA
Encounter #: [id number]
Page [id number]
 
 
 
 
 
Electronically Signed by NASH GREEN on 19 at 1521
 
 
 
 
 
 
**All edits/amendments must be made on the electronic document**
 
DICTATION DATE: 19     : FLORA  19     
RPT#: [id number]                                DC DATE:        
                                               STATUS: ADM IN  
[facility]
[address] [place] [address]
***END OF REPORT***

## 2019-02-05 NOTE — NUR
2000) REC'D. IN BED EYES CLOSED AROUSES EASILY TO VERBAL STIMULI.FAMILY MEMBER
AT BEDSIDE.LUMBAR DRSG. DRY AND INTAKE.DENIES NUMBNESS OR TINGLING.DORSIFLEXES
BILAT. GOOD PEAADAL PULSES PRESENT.WILL CONTINUE TO MONITOR FOR ANY CHGES. AND
FOLLOW CURRENT PLAN OF CARE.

## 2019-02-05 NOTE — NUR
PT DISCHARGED TO INPATIENT REHAB. DISCUSSED DISCHARGE INSTRUCTIONS WITH PT AND
SON. DISCONTINUED IV TO LEFT AC, CATHETER TIP INTACT. IV TO RIGHT HAND, PATENT
AND WITHOUT REDNESS OR SWELLING. TRANSPORTED PT VIA BED TO REHAB. PT DENIES
ANYTHING FURTHER AND DENIES ANY CONCERNS. NO C/O PAIN. NO S/S OF ACUTE
DISTRESS.

## 2019-02-05 NOTE — NUR
REHAB PRESCREENING
Rehab referral received and chart reviewed.  Mr. Mendoza is a good candidate
for ARU.  Rehab will continue to follow for DC of PCA.
Thank you for this referral!
Brianne Daniels [facility]

## 2019-02-05 NOTE — MORECARE
CASE MANAGEMENT DISCHARGE SUMMARY
 
 
PATIENT: EDGARDO MENDOZA                      UNIT: [id number]
ACCOUNT#: [id number]                       ADM DATE: 19
AGE: 84     : 34  SEX: M            ROOM/BED: D.2239    
AUTHOR: NORMA,DOC                             PHYSICIAN:                               
 
REFERRING PHYSICIAN: MAURICIO WILLIAM MD               
DATE OF SERVICE: 19
Discharge Plan
 
 
Patient Name: EDGARDO MENDOZA
Facility: [facility]:[place]
Encounter #: [id number]
Medical Record #: [id number]
: 1934
Planned Disposition: Inpatient Rehab
Anticipated Discharge Date: 
 
Discharge Date: 
Expected LOS: 
Initial Reviewer: [id number]
Initial Review Date: 2019
Generated: 19  10:55 am 
Comments
 
DCP- Discharge Planning
 
Updated by [id number]: Lena Douglas on 19   8:49 am CT
Met with patient, he is sitting up in the chair, to discuss discharge 
planning. He would like to go to inpatient rehab at [facility] when discharged from 
acute care. Rehab screen ordered. CM will continue to follow and assist with 
discharge planning/needs.
DCP- Discharge Planning
 
Updated by [id number]: Lucia Gregorio on 19   9:47 am CT
CM spoke with pt and pt's sons, with his permission. Sons Niles Mendoza and 
Efrem Mendoza present for interview. They state pt has been living with Niles 
and his wife. He has been independent with ADL's, just uses a walker, no 
other equipment. States they have used [facility] in past. They 
state they plan for pt to go to rehab for therapy and their plan is to take 
back home following discharge and care for him at home. Case management with 
follow and assist with discharge planning as needed.
 DCPIA - Discharge Planning Initial Assessment
 
Updated by [id number]: Lucia Gregorio on 19  10:41 am
*  Is the patient Alert and Oriented?
Yes
 
*  How many steps to enter\exit or inside your home? 0/0 *  PCP LENA Flores * 
Pharmacy
[facility]
*  Preadmission Environment
Home with Family
*  ADLs
Independent
*  Equipment
Rolling Walker
*  Other Equipment
none
*  List name and contact numbers for known caregivers / representatives who 
currently or will assist patient after discharge:
Niles Mendoza, son [phone]  binta Eric [phone]
 
*  Verbal permission to speak to the caregivers and representatives has been 
obtained from the patient.
Yes
*  Community resources currently utilized
None
*  Additional services required to return to the preadmission environment?
Yes
*  Can the patient safely return to the preadmission environment?
Yes
*  Has this patient been hospitalized within the prior 30 days at any 
hospital?
No
 
 
 
 
 
 
 
Last DP export: 19   9:52 am
Patient Name: EDGARDO MENDOZA
 
Encounter #: [id number]
Page [phone]
 
 
 
 
 
Electronically Signed by NASH GREEN on 19 at 0955
 
 
 
 
 
 
**All edits/amendments must be made on the electronic document**
 
DICTATION DATE: 19     : FLORA  19     
RPT#: [id number]                                DC DATE:        
                                               STATUS: ADM IN  
[facility]
 [place] [address]
***END OF REPORT***

## 2019-02-06 VITALS — SYSTOLIC BLOOD PRESSURE: 149 MMHG | DIASTOLIC BLOOD PRESSURE: 48 MMHG

## 2019-02-06 LAB
ANION GAP SERPL CALC-SCNC: 14.5 MMOL/L (ref 8–16)
BASOPHILS NFR BLD AUTO: 0.1 % (ref 0–2)
BUN SERPL-MCNC: 18 MG/DL (ref 7–18)
CALCIUM SERPL-MCNC: 8.2 MG/DL (ref 8.5–10.1)
CHLORIDE SERPL-SCNC: 100 MMOL/L (ref 98–107)
CO2 SERPL-SCNC: 23.9 MMOL/L (ref 21–32)
CREAT SERPL-MCNC: 1 MG/DL (ref 0.6–1.3)
EOSINOPHIL NFR BLD: 2.3 % (ref 0–7)
ERYTHROCYTE [DISTWIDTH] IN BLOOD BY AUTOMATED COUNT: 13.5 % (ref 11.5–14.5)
GLUCOSE SERPL-MCNC: 123 MG/DL (ref 74–106)
HCT VFR BLD CALC: 28.6 % (ref 42–54)
HGB BLD-MCNC: 9.6 G/DL (ref 13.5–17.5)
IMM GRANULOCYTES NFR BLD: 0.7 % (ref 0–5)
LYMPHOCYTES NFR BLD AUTO: 11.4 % (ref 15–50)
MCH RBC QN AUTO: 33 PG (ref 26–34)
MCHC RBC AUTO-ENTMCNC: 33.6 G/DL (ref 31–37)
MCV RBC: 98.3 FL (ref 80–100)
MONOCYTES NFR BLD: 13.7 % (ref 2–11)
NEUTROPHILS NFR BLD AUTO: 71.8 % (ref 40–80)
OSMOLALITY SERPL CALC.SUM OF ELEC: 270 MOSM/KG (ref 275–300)
PLATELET # BLD: 181 10X3/UL (ref 130–400)
PMV BLD AUTO: 10.5 FL (ref 7.4–10.4)
POTASSIUM SERPL-SCNC: 4.4 MMOL/L (ref 3.5–5.1)
RBC # BLD AUTO: 2.91 10X6/UL (ref 4.2–6.1)
SODIUM SERPL-SCNC: 134 MMOL/L (ref 136–145)
WBC # BLD AUTO: 9.7 10X3/UL (ref 4.8–10.8)

## 2019-02-07 VITALS — DIASTOLIC BLOOD PRESSURE: 55 MMHG | SYSTOLIC BLOOD PRESSURE: 132 MMHG

## 2019-02-07 VITALS — SYSTOLIC BLOOD PRESSURE: 140 MMHG | DIASTOLIC BLOOD PRESSURE: 56 MMHG

## 2019-02-07 VITALS — SYSTOLIC BLOOD PRESSURE: 124 MMHG | DIASTOLIC BLOOD PRESSURE: 45 MMHG

## 2019-02-08 VITALS — SYSTOLIC BLOOD PRESSURE: 127 MMHG | DIASTOLIC BLOOD PRESSURE: 48 MMHG

## 2019-02-08 VITALS — SYSTOLIC BLOOD PRESSURE: 130 MMHG | DIASTOLIC BLOOD PRESSURE: 60 MMHG

## 2019-02-08 LAB
ANION GAP SERPL CALC-SCNC: 15.3 MMOL/L (ref 8–16)
BASOPHILS NFR BLD AUTO: 0.2 % (ref 0–2)
BUN SERPL-MCNC: 26 MG/DL (ref 7–18)
CALCIUM SERPL-MCNC: 8.2 MG/DL (ref 8.5–10.1)
CHLORIDE SERPL-SCNC: 99 MMOL/L (ref 98–107)
CO2 SERPL-SCNC: 23.7 MMOL/L (ref 21–32)
CREAT SERPL-MCNC: 1.3 MG/DL (ref 0.6–1.3)
EOSINOPHIL NFR BLD: 1.9 % (ref 0–7)
ERYTHROCYTE [DISTWIDTH] IN BLOOD BY AUTOMATED COUNT: 13.4 % (ref 11.5–14.5)
GLUCOSE SERPL-MCNC: 119 MG/DL (ref 74–106)
HCT VFR BLD CALC: 28.4 % (ref 42–54)
HGB BLD-MCNC: 9.6 G/DL (ref 13.5–17.5)
IMM GRANULOCYTES NFR BLD: 2 % (ref 0–5)
LYMPHOCYTES NFR BLD AUTO: 8.7 % (ref 15–50)
MCH RBC QN AUTO: 32.9 PG (ref 26–34)
MCHC RBC AUTO-ENTMCNC: 33.8 G/DL (ref 31–37)
MCV RBC: 97.3 FL (ref 80–100)
MONOCYTES NFR BLD: 13.6 % (ref 2–11)
NEUTROPHILS NFR BLD AUTO: 73.6 % (ref 40–80)
OSMOLALITY SERPL CALC.SUM OF ELEC: 273 MOSM/KG (ref 275–300)
PLATELET # BLD: 219 10X3/UL (ref 130–400)
PMV BLD AUTO: 10.5 FL (ref 7.4–10.4)
POTASSIUM SERPL-SCNC: 4 MMOL/L (ref 3.5–5.1)
RBC # BLD AUTO: 2.92 10X6/UL (ref 4.2–6.1)
SODIUM SERPL-SCNC: 134 MMOL/L (ref 136–145)
WBC # BLD AUTO: 11 10X3/UL (ref 4.8–10.8)

## 2019-02-08 NOTE — MORECARE
CASE MANAGEMENT DISCHARGE SUMMARY
 
 
PATIENT: EDGARDO MENDOZA                      UNIT: [id number]
ACCOUNT#: [id number]                       ADM DATE: 19
AGE: 84     : 34  SEX: M            ROOM/BED: D.2239    
AUTHOR: NORMA,DOC                             PHYSICIAN:                               
 
REFERRING PHYSICIAN: MAURICIO WILLIAM MD               
DATE OF SERVICE: 19
Discharge Plan
 
 
Patient Name: EDGARDO MENDOZA
Facility: [facility]:[place]
Encounter #: [id number]
Medical Record #: [id number]
: 1934
Planned Disposition: Inpatient Rehab
Anticipated Discharge Date: 
 
Discharge Date: 2019
Expected LOS: 0
Initial Reviewer: [id number]
Initial Review Date: 2019
Generated: 19  10:24 am 
Comments
 
DCP- Discharge Planning
 
Updated by [email or web address]: Lena Douglas on 19   2:14 pm CT
Received discharge order, patient and family agree to plan. Ginger in 
inpatient rehab informed of discharge order. CM will continue to follow and 
assist with discharge planning/needs.
DCP- Discharge Planning
 
Updated by [id number]: Lena Stella on 19   8:49 am CT
Met with patient, he is sitting up in the chair, to discuss discharge 
planning. He would like to go to inpatient rehab at [facility] when discharged from 
acute care. Rehab screen ordered. CM will continue to follow and assist with 
discharge planning/needs.
DCP- Discharge Planning
 
Updated by [id number]: Lucia Gregorio on 19   9:47 am CT
CM spoke with pt and pt's sons, with his permission. Sons Niles Mendoza and 
Efrem Mendoza present for interview. They state pt has been living with Niles 
and his wife. He has been independent with ADL's, just uses a walker, no 
other equipment. States they have used [facility] in past. They 
state they plan for pt to go to rehab for therapy and their plan is to take 
back home following discharge and care for him at home. Case management with 
 
follow and assist with discharge planning as needed.
 DCPIA - Discharge Planning Initial Assessment
 
Updated by [email or web address]: Lucia Gregorio on 19  10:41 am
*  Is the patient Alert and Oriented?
Yes
*  How many steps to enter\exit or inside your home? 0/0 *  PCP Yolande Portillo, ANP * 
Pharmacy
[facility]
*  Preadmission Environment
Home with Family
*  ADLs
Independent
*  Equipment
Rolling Walker
*  Other Equipment
none
*  List name and contact numbers for known caregivers / representatives who 
currently or will assist patient after discharge:
Niles Mendoza, son [phone]  Efrem Mendoza, son [phone]
 
*  Verbal permission to speak to the caregivers and representatives has been 
obtained from the patient.
Yes
*  Community resources currently utilized
None
*  Additional services required to return to the preadmission environment?
Yes
*  Can the patient safely return to the preadmission environment?
Yes
*  Has this patient been hospitalized within the prior 30 days at any 
hospital?
No
 
 
 
 
 
Coverage Notice
 
Reviewer: [id number] Ashtyn Douglas
 
Notice Issued Date-Time: 2019  15:11
Notice Type: IM Discharge Notice
 
Notice Delivered To: Patient
Relationship to Patient: Self
Representative Name: 
 
Delivery Method: HAND - Hand Delivered
Mariluz Days:
Prior Verbal Notification: 
 
 
Recipient Understood Notice: Yes
Recipient Signature: Yes
Med Rec Note Co-signed by Attending:
 
Coverage Notice Comment:  IMM explained, signed, given, copy on chart.
 
Last DP export: 19   2:21 pm
Patient Name: EDGARDO MENDOZA
Encounter #: [id number]
Page [id number]
 
 
 
 
 
Electronically Signed by NASH GREEN on 19 at 0924
 
 
 
 
 
 
**All edits/amendments must be made on the electronic document**
 
DICTATION DATE: 19     : FLORA  19     
RPT#: [id number]                                DC DATE:19
                                               STATUS: DIS IN  
[facility]
[address] [place] [address]
***END OF REPORT***

## 2019-02-09 VITALS — SYSTOLIC BLOOD PRESSURE: 135 MMHG | DIASTOLIC BLOOD PRESSURE: 54 MMHG

## 2019-02-09 LAB
ALBUMIN SERPL-MCNC: 1.9 G/DL (ref 3.4–5)
ALP SERPL-CCNC: 142 U/L (ref 46–116)
ALT SERPL-CCNC: 39 U/L (ref 10–68)
ANION GAP SERPL CALC-SCNC: 18.4 MMOL/L (ref 8–16)
BASOPHILS NFR BLD AUTO: 0.2 % (ref 0–2)
BILIRUB SERPL-MCNC: 1.87 MG/DL (ref 0.2–1.3)
BUN SERPL-MCNC: 29 MG/DL (ref 7–18)
CALCIUM SERPL-MCNC: 8.3 MG/DL (ref 8.5–10.1)
CHLORIDE SERPL-SCNC: 98 MMOL/L (ref 98–107)
CO2 SERPL-SCNC: 19.6 MMOL/L (ref 21–32)
CREAT SERPL-MCNC: 1.4 MG/DL (ref 0.6–1.3)
EOSINOPHIL NFR BLD: 0.1 % (ref 0–7)
ERYTHROCYTE [DISTWIDTH] IN BLOOD BY AUTOMATED COUNT: 13.9 % (ref 11.5–14.5)
GLOBULIN SER-MCNC: 4.2 G/L
GLUCOSE SERPL-MCNC: 110 MG/DL (ref 74–106)
HCT VFR BLD CALC: 27.4 % (ref 42–54)
HGB BLD-MCNC: 9.3 G/DL (ref 13.5–17.5)
IMM GRANULOCYTES NFR BLD: 2.3 % (ref 0–5)
LYMPHOCYTES NFR BLD AUTO: 6.7 % (ref 15–50)
MCH RBC QN AUTO: 33.2 PG (ref 26–34)
MCHC RBC AUTO-ENTMCNC: 33.9 G/DL (ref 31–37)
MCV RBC: 97.9 FL (ref 80–100)
MONOCYTES NFR BLD: 14.3 % (ref 2–11)
NEUTROPHILS NFR BLD AUTO: 76.4 % (ref 40–80)
OSMOLALITY SERPL CALC.SUM OF ELEC: 271 MOSM/KG (ref 275–300)
PLATELET # BLD: 238 10X3/UL (ref 130–400)
PMV BLD AUTO: 10.5 FL (ref 7.4–10.4)
POTASSIUM SERPL-SCNC: 4 MMOL/L (ref 3.5–5.1)
PROT SERPL-MCNC: 6.1 G/DL (ref 6.4–8.2)
RBC # BLD AUTO: 2.8 10X6/UL (ref 4.2–6.1)
SODIUM SERPL-SCNC: 132 MMOL/L (ref 136–145)
WBC # BLD AUTO: 16.7 10X3/UL (ref 4.8–10.8)

## 2019-02-10 VITALS — DIASTOLIC BLOOD PRESSURE: 45 MMHG | SYSTOLIC BLOOD PRESSURE: 119 MMHG

## 2019-02-10 VITALS — SYSTOLIC BLOOD PRESSURE: 111 MMHG | DIASTOLIC BLOOD PRESSURE: 47 MMHG

## 2019-02-10 LAB
ANION GAP SERPL CALC-SCNC: 17.9 MMOL/L (ref 8–16)
APPEARANCE UR: CLEAR
BACTERIA #/AREA URNS HPF: (no result) /HPF
BASOPHILS NFR BLD AUTO: 0.2 % (ref 0–2)
BILIRUB SERPL-MCNC: (no result) MG/DL
BUN SERPL-MCNC: 32 MG/DL (ref 7–18)
CALCIUM SERPL-MCNC: 8.4 MG/DL (ref 8.5–10.1)
CHLORIDE SERPL-SCNC: 95 MMOL/L (ref 98–107)
CO2 SERPL-SCNC: 22.7 MMOL/L (ref 21–32)
COLOR UR: (no result)
CREAT SERPL-MCNC: 1.4 MG/DL (ref 0.6–1.3)
EOSINOPHIL NFR BLD: 1 % (ref 0–7)
ERYTHROCYTE [DISTWIDTH] IN BLOOD BY AUTOMATED COUNT: 13.9 % (ref 11.5–14.5)
GLUCOSE SERPL-MCNC: 137 MG/DL (ref 74–106)
GLUCOSE SERPL-MCNC: NEGATIVE MG/DL
HCT VFR BLD CALC: 28.7 % (ref 42–54)
HGB BLD-MCNC: 9.8 G/DL (ref 13.5–17.5)
IMM GRANULOCYTES NFR BLD: 2 % (ref 0–5)
KETONES UR STRIP-MCNC: NEGATIVE MG/DL
LYMPHOCYTES NFR BLD AUTO: 5.6 % (ref 15–50)
MCH RBC QN AUTO: 32.8 PG (ref 26–34)
MCHC RBC AUTO-ENTMCNC: 34.1 G/DL (ref 31–37)
MCV RBC: 96 FL (ref 80–100)
MONOCYTES NFR BLD: 8 % (ref 2–11)
NEUTROPHILS NFR BLD AUTO: 83.2 % (ref 40–80)
NITRITE UR-MCNC: NEGATIVE MG/ML
OSMOLALITY SERPL CALC.SUM OF ELEC: 273 MOSM/KG (ref 275–300)
PH UR STRIP: 5 [PH] (ref 5–6)
PLATELET # BLD: 263 10X3/UL (ref 130–400)
PMV BLD AUTO: 10.5 FL (ref 7.4–10.4)
POTASSIUM SERPL-SCNC: 3.6 MMOL/L (ref 3.5–5.1)
PROT UR-MCNC: (no result) MG/DL
RBC # BLD AUTO: 2.99 10X6/UL (ref 4.2–6.1)
RBC #/AREA URNS HPF: (no result) /HPF (ref 0–5)
SODIUM SERPL-SCNC: 132 MMOL/L (ref 136–145)
SP GR UR STRIP: 1.02 (ref 1–1.02)
UROBILINOGEN UR-MCNC: 4 MG/DL
WBC # BLD AUTO: 16.8 10X3/UL (ref 4.8–10.8)
WBC #/AREA URNS HPF: (no result) /HPF (ref 0–5)

## 2019-02-11 VITALS — SYSTOLIC BLOOD PRESSURE: 127 MMHG | DIASTOLIC BLOOD PRESSURE: 43 MMHG

## 2019-02-11 LAB
%HYPO/RBC NFR BLD AUTO: (no result) %
ANION GAP SERPL CALC-SCNC: 16.4 MMOL/L (ref 8–16)
ANISOCYTOSIS BLD QL SMEAR: (no result)
BUN SERPL-MCNC: 34 MG/DL (ref 7–18)
CALCIUM SERPL-MCNC: 8.3 MG/DL (ref 8.5–10.1)
CHLORIDE SERPL-SCNC: 98 MMOL/L (ref 98–107)
CO2 SERPL-SCNC: 22.1 MMOL/L (ref 21–32)
CREAT SERPL-MCNC: 1.2 MG/DL (ref 0.6–1.3)
EOSINOPHIL NFR BLD: 1 % (ref 0–7)
ERYTHROCYTE [DISTWIDTH] IN BLOOD BY AUTOMATED COUNT: 14.3 % (ref 11.5–14.5)
GLUCOSE SERPL-MCNC: 145 MG/DL (ref 74–106)
HCT VFR BLD CALC: 26.7 % (ref 42–54)
HGB BLD-MCNC: 9 G/DL (ref 13.5–17.5)
LYMPHOCYTES NFR BLD AUTO: 17 % (ref 15–50)
MCH RBC QN AUTO: 32.3 PG (ref 26–34)
MCHC RBC AUTO-ENTMCNC: 33.7 G/DL (ref 31–37)
MCV RBC: 95.7 FL (ref 80–100)
MONOCYTES NFR BLD: 13 % (ref 2–11)
NEUTROPHILS NFR BLD AUTO: 62 % (ref 40–80)
OSMOLALITY SERPL CALC.SUM OF ELEC: 276 MOSM/KG (ref 275–300)
PLATELET # BLD EST: NORMAL 10*3/UL
PLATELET # BLD: 292 10X3/UL (ref 130–400)
PMV BLD AUTO: 10.9 FL (ref 7.4–10.4)
POTASSIUM SERPL-SCNC: 3.5 MMOL/L (ref 3.5–5.1)
RBC # BLD AUTO: 2.79 10X6/UL (ref 4.2–6.1)
ROULEAUX BLD QL SMEAR: (no result)
SODIUM SERPL-SCNC: 133 MMOL/L (ref 136–145)
WBC # BLD AUTO: 17.6 10X3/UL (ref 4.8–10.8)

## 2019-02-12 VITALS — SYSTOLIC BLOOD PRESSURE: 115 MMHG | DIASTOLIC BLOOD PRESSURE: 42 MMHG

## 2019-02-12 VITALS — DIASTOLIC BLOOD PRESSURE: 46 MMHG | SYSTOLIC BLOOD PRESSURE: 97 MMHG

## 2019-02-12 VITALS — SYSTOLIC BLOOD PRESSURE: 151 MMHG | DIASTOLIC BLOOD PRESSURE: 52 MMHG

## 2019-02-13 VITALS — SYSTOLIC BLOOD PRESSURE: 147 MMHG | DIASTOLIC BLOOD PRESSURE: 61 MMHG

## 2019-02-13 LAB
ANION GAP SERPL CALC-SCNC: 15.6 MMOL/L (ref 8–16)
BUN SERPL-MCNC: 31 MG/DL (ref 7–18)
CALCIUM SERPL-MCNC: 8.9 MG/DL (ref 8.5–10.1)
CHLORIDE SERPL-SCNC: 99 MMOL/L (ref 98–107)
CO2 SERPL-SCNC: 25.6 MMOL/L (ref 21–32)
CREAT SERPL-MCNC: 1.2 MG/DL (ref 0.6–1.3)
EOSINOPHIL NFR BLD: 6 % (ref 0–7)
ERYTHROCYTE [DISTWIDTH] IN BLOOD BY AUTOMATED COUNT: 14.4 % (ref 11.5–14.5)
GLUCOSE SERPL-MCNC: 124 MG/DL (ref 74–106)
HCT VFR BLD CALC: 28.2 % (ref 42–54)
HGB BLD-MCNC: 9.5 G/DL (ref 13.5–17.5)
LYMPHOCYTES NFR BLD AUTO: 18 % (ref 15–50)
MCH RBC QN AUTO: 32 PG (ref 26–34)
MCHC RBC AUTO-ENTMCNC: 33.7 G/DL (ref 31–37)
MCV RBC: 94.9 FL (ref 80–100)
MONOCYTES NFR BLD: 7 % (ref 2–11)
NEUTROPHILS NFR BLD AUTO: 64 % (ref 40–80)
OSMOLALITY SERPL CALC.SUM OF ELEC: 281 MOSM/KG (ref 275–300)
PLATELET # BLD EST: NORMAL 10*3/UL
PLATELET # BLD: 398 10X3/UL (ref 130–400)
PMV BLD AUTO: 10.5 FL (ref 7.4–10.4)
POTASSIUM SERPL-SCNC: 3.2 MMOL/L (ref 3.5–5.1)
RBC # BLD AUTO: 2.97 10X6/UL (ref 4.2–6.1)
SODIUM SERPL-SCNC: 137 MMOL/L (ref 136–145)
WBC # BLD AUTO: 12.6 10X3/UL (ref 4.8–10.8)

## 2019-02-13 NOTE — RHP
PATIENT: EDGARDO SARAVIA                                  MEDICAL RECORD: [id number]
ACCOUNT: [id number]                                    [facility]
: 34                                            ADMISSION DATE: 19
                                                         
 
                     REHABILITATION HISTORY AND PHYSICAL EXAMINATION
                         POST ADMISSION PHYSICIAN EXAMINATION
 
 
POST-ADMISSION PHYSICAL EXAMINATION AND HISTORY AND PHYSICAL
 
DATE OF ADMISSION:  2019
 
ADMITTING DIAGNOSIS:  Severe spinal stenosis, status post laminectomy of L3-L4
and L4-L5.
 
HISTORY OF PRESENT ILLNESS:  The patient was admitted to inpatient rehab
secondary to an impairment group with nontraumatic spinal cord dysfunction.  He
has severe spinal stenosis.  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  He is
an 84-year-old gentleman, who presented to the ED on .  He was unable to
mobilize his left leg from his hip down to his feet.  He stated that both legs
hurt, but he was having numbness and heavy sensation.  He recently had a CT of
his spine, which revealed severe spinal cord stenosis.  He had a myelogram with
contrast material placed in the thecal sac, but there is no sac filling below
L3.  The patient was admitted to the acute hospital.  Dr. Chance consulted for
neuropathy, pain control, radiculopathy, neurogenic claudication, and myopathy. 
Dr. Blum was also consulted for clearance, so that they could hold his Plavix. 
He performed a lumbar laminectomy of L3-L4 and L4-L5.  His past medical history
is significant for severe lumbar stenosis and neuropathy, macrocytic anemia,
hypothyroidism, coronary artery bypass grafting and coronary artery disease.  He
has got a bioprosthetic aortic valve.  He wears hearing aids.  He has got
history of hypertension, coronary artery disease and stents.  He had been placed
on Plavix and some arthritic medications, which all needs to be continued during
rehab and followed to make sure he has no bleeding abnormalities.  Barriers to
discharge at this time include pain control.  He has got proximal muscle
weakness, increased bowel regimen.  He has got 2-person assist to ambulate at
this time.  Wound care, self-care deficits, anticoagulation management, status
post laminectomy.  Prior to this, decline in function on admission.  He was
independent with ADLs, moderately independent with rolling walker at home.  He
is currently ambulating 4 feet with rolling walker with 2-person assist and mod
assist for ADLs.  He lives with his son.  He plans to return there after
discharge.  He will require intensive therapy to get better and return home. 
Comorbidities include radiculopathy, myelopathy, intractable back pain,
neuropathy, coronary artery disease, BPH, diabetes, arthritis, thyroid problems,
and pacemaker placement.
 
PAST MEDICAL HISTORY:  Significant for neuropathy, he is hard of hearing,
diabetes, thyroid problems, hypertension, sick sinus syndrome, coronary artery
disease, valvular problems, prostate problems, arthritis.
 
PAST SURGICAL HISTORY:  Includes coronary artery bypass grafting.  He has had a
valve replacement.  He has had ankle surgery and carpal tunnel surgery and
prostate surgery.
 
ALLERGIES:  No known drug allergies.
 
CURRENT MEDICATIONS:  Include Mevacor 20 mg in the evening.  He is on metoprolol
100 mg daily.  He is on losartan 100 mg daily, Synthroid 50 mcg daily,
 
 
 
HISTORY AND PHYSICAL                           [id number]    EDGARDO SARAVIA          
 
 
hydrochlorothiazide 12.5 mg daily, Plavix 75 mg daily, citalopram 10 mg daily,
aspirin 81 mg daily, Norvasc 2.5 mg daily, Zofran 4 mg every 6 hours p.r.n.
nausea and vomiting, Robaxin 750 t.i.d. p.r.n., MiraLax 17 grams in 8 ounces of
water daily.  He is on morphine 4 mg as needed for breakthrough pain, Robaxin
500 mg q.i.d.  He is on Mylanta 30 cc every 6 hours p.r.n.  He is on an
intermediate resistant sliding scale with Humulin.  He is on Norco 5/325 one to
two tabs every 4 hours p.r.n.  He is on Neurontin 300 mg t.i.d., Colace 100 mg
b.i.d., and Benadryl 25 mg as needed for itching.
 
HABITS:  No alcohol or tobacco use.
 
FAMILY HISTORY:  Noncontributory.
 
SOCIAL HISTORY:  The patient hopes to return back home and get back to his prior
level of functioning.
 
REVIEW OF SYSTEMS:
GENERAL:  Does complain of weakness and fatigue.
HEENT:  Denies cold, cough, or congestion.
CARDIOVASCULAR:  Denies chest pain.
 
PHYSICAL EXAMINATION:
VITAL SIGNS:  Stable, afebrile.
GENERAL:  Elderly gentleman, in no acute distress upon exam.
HEENT:  Normocephalic and atraumatic.  Mucosa moist.
NECK:  Supple.  No lymphadenopathy.
LUNGS:  Clear at this time.
HEART:  Regular rate and rhythm.  No murmurs, rubs or gallops.
ABDOMEN:  Benign.
EXTREMITIES:  No clubbing, cyanosis or edema.
NEUROLOGIC:  He does have noted proximal muscle weakness.
 
LABORATORY DATA:  White count is 9.7, H&H of 9.6 and 28.6, and platelet count is
181.  His sodium is 134, potassium 4.4, BUN and creatinine of 18 and 1.0, and
blood sugar is noted to be 123.
 
ASSESSMENT:  This is an 84-year-old gentleman, who is admitted to the rehab with
severe spinal stenosis, status post laminectomy.  The patient has potential to
make improvement.  We will institute the following multidisciplinary therapies
including, not limited to, physical, occupational, respiratory, speech,
nutritional services, prosthetics and orthotics.  Given his complex medical
condition and risks for more complications, rehabilitation services cannot be
provided at a low level of care such as a skilled nursing facility.
 
PLAN:
1.  Admit to [facility] for intensive inpatient therapy to
include the following disciplines:
A.  Physical therapy to improve gait, all transfer skills and bed mobility to a
modified independent level.
B.  Occupational therapy to improve activities of daily living to a modified
independent level.
C.  Case management to assist with discharge planning and placement options.
D.  Nutrition to assist with nutritional needs.
E.  Rehabilitation nursing to assist in monitoring the patient's underlying
medical conditions and to assist with any type of bowel or bladder management.
 
 
 
HISTORY AND PHYSICAL                           [id number]    EDGARDO SARAVIA          
 
 
2.  The patient's current medications and medical care will be continued.
3.  The patient will be placed on standard fall precautions.
4.  The patient's estimated length of stay is approximately 7-10 days.
5.  We will discuss this patient during care team staff meeting this week.
 
TRANSINT:[id number] Voice Confirmation ID: [id number] DOCUMENT ID: [id number]
 
 
NINOSKA notes whether there has been none or any medical/functional
change since admission:
- No change since preadmission screen.                                  
 
 
NINOSKA attests patient continues to be appropriate for IRF:
- Continues to be appropriate.                                          
 
 
                                           
                                           JOSELYN HERNANDEZ MD               
 
 
 
Electronically Signed by JOSELYN HERNANDEZ on 19 at 1219
 
 
 
 
 
 
 
 
 
 
 
 
 
 
 
 
 
 
 
 
 
 
 
 
CC:                                                             [id number]
DICTATION DATE: 19 0839     :     19 1010      ADM IN  
                                                                              
[place] [address]

## 2019-02-14 VITALS — SYSTOLIC BLOOD PRESSURE: 128 MMHG | DIASTOLIC BLOOD PRESSURE: 44 MMHG

## 2019-02-14 VITALS — SYSTOLIC BLOOD PRESSURE: 129 MMHG | DIASTOLIC BLOOD PRESSURE: 46 MMHG

## 2019-02-15 VITALS — DIASTOLIC BLOOD PRESSURE: 53 MMHG | SYSTOLIC BLOOD PRESSURE: 140 MMHG

## 2019-02-15 VITALS — SYSTOLIC BLOOD PRESSURE: 126 MMHG | DIASTOLIC BLOOD PRESSURE: 53 MMHG

## 2019-02-15 LAB
ANION GAP SERPL CALC-SCNC: 15.2 MMOL/L (ref 8–16)
BASOPHILS NFR BLD AUTO: 2.9 % (ref 0–2)
BUN SERPL-MCNC: 28 MG/DL (ref 7–18)
CALCIUM SERPL-MCNC: 8.5 MG/DL (ref 8.5–10.1)
CHLORIDE SERPL-SCNC: 102 MMOL/L (ref 98–107)
CO2 SERPL-SCNC: 23.8 MMOL/L (ref 21–32)
CREAT SERPL-MCNC: 1.3 MG/DL (ref 0.6–1.3)
EOSINOPHIL NFR BLD: 3.4 % (ref 0–7)
ERYTHROCYTE [DISTWIDTH] IN BLOOD BY AUTOMATED COUNT: 14.9 % (ref 11.5–14.5)
GLUCOSE SERPL-MCNC: 128 MG/DL (ref 74–106)
HCT VFR BLD CALC: 27.8 % (ref 42–54)
HGB BLD-MCNC: 9.3 G/DL (ref 13.5–17.5)
IMM GRANULOCYTES NFR BLD: 16.3 % (ref 0–5)
LYMPHOCYTES NFR BLD AUTO: 17.3 % (ref 15–50)
MCH RBC QN AUTO: 32 PG (ref 26–34)
MCHC RBC AUTO-ENTMCNC: 33.5 G/DL (ref 31–37)
MCV RBC: 95.5 FL (ref 80–100)
MONOCYTES NFR BLD: 12.1 % (ref 2–11)
NEUTROPHILS NFR BLD AUTO: 48 % (ref 40–80)
OSMOLALITY SERPL CALC.SUM OF ELEC: 281 MOSM/KG (ref 275–300)
PLATELET # BLD: 476 10X3/UL (ref 130–400)
PMV BLD AUTO: 10.2 FL (ref 7.4–10.4)
POTASSIUM SERPL-SCNC: 4 MMOL/L (ref 3.5–5.1)
RBC # BLD AUTO: 2.91 10X6/UL (ref 4.2–6.1)
SODIUM SERPL-SCNC: 137 MMOL/L (ref 136–145)
WBC # BLD AUTO: 10.8 10X3/UL (ref 4.8–10.8)

## 2019-02-16 VITALS — SYSTOLIC BLOOD PRESSURE: 135 MMHG | DIASTOLIC BLOOD PRESSURE: 51 MMHG

## 2019-02-17 VITALS — DIASTOLIC BLOOD PRESSURE: 63 MMHG | SYSTOLIC BLOOD PRESSURE: 141 MMHG

## 2019-02-17 VITALS — DIASTOLIC BLOOD PRESSURE: 68 MMHG | SYSTOLIC BLOOD PRESSURE: 146 MMHG

## 2019-02-18 VITALS — SYSTOLIC BLOOD PRESSURE: 147 MMHG | DIASTOLIC BLOOD PRESSURE: 57 MMHG

## 2019-02-18 VITALS — SYSTOLIC BLOOD PRESSURE: 118 MMHG | DIASTOLIC BLOOD PRESSURE: 52 MMHG

## 2019-02-18 LAB
ANION GAP SERPL CALC-SCNC: 17.2 MMOL/L (ref 8–16)
BUN SERPL-MCNC: 26 MG/DL (ref 7–18)
CALCIUM SERPL-MCNC: 8.6 MG/DL (ref 8.5–10.1)
CHLORIDE SERPL-SCNC: 103 MMOL/L (ref 98–107)
CO2 SERPL-SCNC: 21.7 MMOL/L (ref 21–32)
CREAT SERPL-MCNC: 1.4 MG/DL (ref 0.6–1.3)
EOSINOPHIL NFR BLD: 3 % (ref 0–7)
ERYTHROCYTE [DISTWIDTH] IN BLOOD BY AUTOMATED COUNT: 15 % (ref 11.5–14.5)
GLUCOSE SERPL-MCNC: 111 MG/DL (ref 74–106)
HCT VFR BLD CALC: 29.3 % (ref 42–54)
HGB BLD-MCNC: 9.6 G/DL (ref 13.5–17.5)
IMM GRANULOCYTES NFR BLD: 13 % (ref 0–5)
LYMPHOCYTES NFR BLD AUTO: 15 % (ref 15–50)
MCH RBC QN AUTO: 32.3 PG (ref 26–34)
MCHC RBC AUTO-ENTMCNC: 32.8 G/DL (ref 31–37)
MCV RBC: 98.7 FL (ref 80–100)
MONOCYTES NFR BLD: 16 % (ref 2–11)
NEUTROPHILS NFR BLD AUTO: 53 % (ref 40–80)
OSMOLALITY SERPL CALC.SUM OF ELEC: 279 MOSM/KG (ref 275–300)
PLATELET # BLD EST: (no result) 10*3/UL
PLATELET # BLD: 467 10X3/UL (ref 130–400)
PMV BLD AUTO: 9.9 FL (ref 7.4–10.4)
POTASSIUM SERPL-SCNC: 4.9 MMOL/L (ref 3.5–5.1)
RBC # BLD AUTO: 2.97 10X6/UL (ref 4.2–6.1)
ROULEAUX BLD QL SMEAR: (no result)
SODIUM SERPL-SCNC: 137 MMOL/L (ref 136–145)
WBC # BLD AUTO: 11.1 10X3/UL (ref 4.8–10.8)

## 2019-02-19 VITALS — SYSTOLIC BLOOD PRESSURE: 147 MMHG | DIASTOLIC BLOOD PRESSURE: 61 MMHG

## 2019-02-19 VITALS — DIASTOLIC BLOOD PRESSURE: 65 MMHG | SYSTOLIC BLOOD PRESSURE: 123 MMHG

## 2019-02-20 VITALS — SYSTOLIC BLOOD PRESSURE: 118 MMHG | DIASTOLIC BLOOD PRESSURE: 53 MMHG
